# Patient Record
Sex: FEMALE | Race: WHITE | NOT HISPANIC OR LATINO | URBAN - METROPOLITAN AREA
[De-identification: names, ages, dates, MRNs, and addresses within clinical notes are randomized per-mention and may not be internally consistent; named-entity substitution may affect disease eponyms.]

---

## 2024-03-01 ENCOUNTER — OV NP (OUTPATIENT)
Dept: URBAN - METROPOLITAN AREA CLINIC 19 | Facility: CLINIC | Age: 79
End: 2024-03-01
Payer: MEDICARE

## 2024-03-01 VITALS
TEMPERATURE: 97.6 F | BODY MASS INDEX: 28.72 KG/M2 | SYSTOLIC BLOOD PRESSURE: 132 MMHG | HEART RATE: 95 BPM | WEIGHT: 183 LBS | HEIGHT: 67 IN | DIASTOLIC BLOOD PRESSURE: 64 MMHG

## 2024-03-01 DIAGNOSIS — K59.09: ICD-10-CM

## 2024-03-01 PROCEDURE — 99243 OFF/OP CNSLTJ NEW/EST LOW 30: CPT | Performed by: NURSE PRACTITIONER

## 2024-03-01 PROCEDURE — 99203 OFFICE O/P NEW LOW 30 MIN: CPT | Performed by: NURSE PRACTITIONER

## 2024-03-01 RX ORDER — LOSARTAN POTASSIUM 50 MG/1
TABLET ORAL
Qty: 0 | Refills: 0 | Status: ACTIVE | COMMUNITY
Start: 1900-01-01

## 2024-03-01 RX ORDER — ASPIRIN 81 MG
TABLET, DELAYED RELEASE (ENTERIC COATED) ORAL
Qty: 0 | Refills: 0 | Status: ACTIVE | COMMUNITY
Start: 1900-01-01

## 2024-03-01 NOTE — HPI-TODAY'S VISIT:
Ms. Dia is a 78-year-old female with PMH HTN, anxiety/depression, spinal surgery presents today for complaints of severe constipation. Possible opiate induced constipation as she is on buprenorphine.  Sent upon referral from Dr. Stephen Webb.  A copy of this report will be sent to the referring provider.  She reports onset 5 to 6 weeks ago.  Constipation started when her pain medication got switched from a patch to beprenorphine. Also has nausea with this med so takes Zofran BID.  She has taken Miralax for past 3-4 years - taking one cap daily. Takes Activia once/day. She reports she has labs every 6 months with PCP and all labs WNL including thyroid. Was going to try Movantik and Relistor but she declined as both med were too costly for her.   Last colonoscopy was 6/14/2018 at another facility.  Diverticulosis in the sigmoid colon, tortuous colon, otherwise normal, no specimens collected.  10-year follow-up  Has been seen here back in 2018/2019 by Dr. Ward and Dr. Garduno, at that time she was having diarrhea after a trip to [place] and [place], multiple stool samples were negative She had RUQ US in 2018 that showed stones and sludge within the gallbladder and a CT A/P in 2021 was negative for any acute intra-abdominal findings    She called in a fw days ago stating she had not had a BM in over a week and I told her to take Magnesium Citrate. She reports having a very large BM to the point of diarrhea with this. LBM was yesterday..

## 2024-04-08 ENCOUNTER — OV NP (OUTPATIENT)
Dept: URBAN - METROPOLITAN AREA CLINIC 19 | Facility: CLINIC | Age: 79
End: 2024-04-08